# Patient Record
Sex: MALE | Race: WHITE | Employment: UNEMPLOYED | URBAN - METROPOLITAN AREA
[De-identification: names, ages, dates, MRNs, and addresses within clinical notes are randomized per-mention and may not be internally consistent; named-entity substitution may affect disease eponyms.]

---

## 2018-12-13 ENCOUNTER — HOSPITAL ENCOUNTER (EMERGENCY)
Age: 43
Discharge: HOME | End: 2018-12-13
Attending: EMERGENCY MEDICINE
Payer: MEDICARE

## 2018-12-13 ENCOUNTER — APPOINTMENT (OUTPATIENT)
Dept: GENERAL RADIOLOGY | Age: 43
End: 2018-12-13
Payer: MEDICARE

## 2018-12-13 VITALS
SYSTOLIC BLOOD PRESSURE: 137 MMHG | TEMPERATURE: 97.6 F | RESPIRATION RATE: 14 BRPM | HEART RATE: 96 BPM | WEIGHT: 185 LBS | OXYGEN SATURATION: 98 % | DIASTOLIC BLOOD PRESSURE: 89 MMHG | BODY MASS INDEX: 25.09 KG/M2

## 2018-12-13 DIAGNOSIS — S61.012A LACERATION OF LEFT THUMB WITHOUT FOREIGN BODY WITHOUT DAMAGE TO NAIL, INITIAL ENCOUNTER: Primary | ICD-10-CM

## 2018-12-13 PROCEDURE — 96372 THER/PROPH/DIAG INJ SC/IM: CPT

## 2018-12-13 PROCEDURE — 99283 EMERGENCY DEPT VISIT LOW MDM: CPT

## 2018-12-13 PROCEDURE — 2500000003 HC RX 250 WO HCPCS: Performed by: STUDENT IN AN ORGANIZED HEALTH CARE EDUCATION/TRAINING PROGRAM

## 2018-12-13 PROCEDURE — 12001 RPR S/N/AX/GEN/TRNK 2.5CM/<: CPT

## 2018-12-13 PROCEDURE — 73130 X-RAY EXAM OF HAND: CPT

## 2018-12-13 PROCEDURE — 6360000002 HC RX W HCPCS: Performed by: STUDENT IN AN ORGANIZED HEALTH CARE EDUCATION/TRAINING PROGRAM

## 2018-12-13 RX ORDER — KETOROLAC TROMETHAMINE 30 MG/ML
30 INJECTION, SOLUTION INTRAMUSCULAR; INTRAVENOUS ONCE
Status: COMPLETED | OUTPATIENT
Start: 2018-12-13 | End: 2018-12-13

## 2018-12-13 RX ORDER — IBUPROFEN 600 MG/1
600 TABLET ORAL EVERY 6 HOURS PRN
Qty: 30 TABLET | Refills: 0 | Status: SHIPPED | OUTPATIENT
Start: 2018-12-13

## 2018-12-13 RX ORDER — CEPHALEXIN 500 MG/1
500 CAPSULE ORAL 4 TIMES DAILY
Qty: 21 CAPSULE | Refills: 0 | Status: SHIPPED | OUTPATIENT
Start: 2018-12-13

## 2018-12-13 RX ORDER — LIDOCAINE HYDROCHLORIDE 10 MG/ML
20 INJECTION, SOLUTION INFILTRATION; PERINEURAL ONCE
Status: COMPLETED | OUTPATIENT
Start: 2018-12-13 | End: 2018-12-13

## 2018-12-13 RX ADMIN — LIDOCAINE HYDROCHLORIDE 20 ML: 10 INJECTION, SOLUTION INFILTRATION; PERINEURAL at 14:12

## 2018-12-13 RX ADMIN — KETOROLAC TROMETHAMINE 30 MG: 30 INJECTION, SOLUTION INTRAMUSCULAR at 12:19

## 2018-12-13 ASSESSMENT — ENCOUNTER SYMPTOMS
BACK PAIN: 0
NAUSEA: 0
RHINORRHEA: 0
ABDOMINAL PAIN: 0
COUGH: 0
VOMITING: 0
SHORTNESS OF BREATH: 0

## 2018-12-13 ASSESSMENT — PAIN SCALES - GENERAL: PAINLEVEL_OUTOF10: 0

## 2018-12-13 ASSESSMENT — PAIN DESCRIPTION - DESCRIPTORS: DESCRIPTORS: NUMBNESS

## 2018-12-13 NOTE — ED PROVIDER NOTES
Negative for cough and shortness of breath. Cardiovascular: Negative for chest pain. Gastrointestinal: Negative for abdominal pain, nausea and vomiting. Genitourinary: Negative for decreased urine volume and urgency. Musculoskeletal: Negative for back pain and gait problem. Skin: Negative for rash. Neurological: Positive for numbness. Negative for light-headedness and headaches. PHYSICAL EXAM   (up to 7 for level 4, 8 or more for level 5)      INITIAL VITALS:   /89   Pulse 96   Temp 97.6 °F (36.4 °C) (Oral)   Resp 14   Wt 185 lb (83.9 kg)   SpO2 98%   BMI 25.09 kg/m²     Physical Exam   Constitutional: He is oriented to person, place, and time. He appears well-developed and well-nourished. No distress. HENT:   Head: Normocephalic and atraumatic. Mouth/Throat: Oropharynx is clear and moist.   Eyes: Conjunctivae and EOM are normal.   Cardiovascular: Normal rate and regular rhythm. Pulmonary/Chest: Effort normal and breath sounds normal.   Abdominal: Soft. Bowel sounds are normal.   Musculoskeletal: Normal range of motion. He exhibits no edema. Left hand: He exhibits tenderness, laceration and swelling. He exhibits no bony tenderness, normal capillary refill and no deformity. Decreased strength noted. He exhibits finger abduction and thumb/finger opposition. He exhibits no wrist extension trouble. Hands:  Patient reports sensation is dulled to left thenar eminence; muscle belly exposed over thenar eminence   Neurological: He is alert and oriented to person, place, and time. Skin: Skin is warm. See MSK exam for hand lac   Nursing note and vitals reviewed.       DIFFERENTIAL  DIAGNOSIS     PLAN (LABS / IMAGING / EKG):  Orders Placed This Encounter   Procedures    XR HAND LEFT (MIN 3 VIEWS)       MEDICATIONS ORDERED:  Orders Placed This Encounter   Medications    ketorolac (TORADOL) injection 30 mg    lidocaine 1 % injection 20 mL    cephALEXin (KEFLEX) 500 MG Jc Vazquez MD  EmergencyMedicine Resident    (Please note that portions of this note were completed with a voice recognition program.  Efforts were made to edit the dictations but occasionally words are mis-transcribed.)       Jose Méndez MD  12/13/18 [id number]

## 2018-12-13 NOTE — ED PROVIDER NOTES
elements of the E/M (history, physical exam, and MDM). Additional findings are as noted. For APC cases I have personally evaluated and examined the patient in conjunction with the APC and agree with the treatment plan and disposition of the patient as recorded by the APC.     Peng Mora MD  Attending Emergency  Physician       Jonathon Bland MD  12/13/18 [id number]

## 2022-08-24 ENCOUNTER — HOSPITAL ENCOUNTER (EMERGENCY)
Age: 47
Discharge: HOME | End: 2022-08-24
Attending: EMERGENCY MEDICINE
Payer: MEDICARE

## 2022-08-24 VITALS
HEIGHT: 72 IN | BODY MASS INDEX: 31.15 KG/M2 | TEMPERATURE: 97.8 F | OXYGEN SATURATION: 98 % | WEIGHT: 230 LBS | DIASTOLIC BLOOD PRESSURE: 87 MMHG | RESPIRATION RATE: 18 BRPM | HEART RATE: 100 BPM | SYSTOLIC BLOOD PRESSURE: 128 MMHG

## 2022-08-24 DIAGNOSIS — L30.9 DERMATITIS: Primary | ICD-10-CM

## 2022-08-24 PROCEDURE — 6370000000 HC RX 637 (ALT 250 FOR IP): Performed by: EMERGENCY MEDICINE

## 2022-08-24 PROCEDURE — 99283 EMERGENCY DEPT VISIT LOW MDM: CPT

## 2022-08-24 RX ORDER — PREDNISONE 10 MG/1
TABLET ORAL
Qty: 40 TABLET | Refills: 0 | Status: SHIPPED | OUTPATIENT
Start: 2022-08-24

## 2022-08-24 RX ORDER — PREDNISONE 20 MG/1
40 TABLET ORAL ONCE
Status: COMPLETED | OUTPATIENT
Start: 2022-08-24 | End: 2022-08-24

## 2022-08-24 RX ADMIN — PREDNISONE 40 MG: 20 TABLET ORAL at 19:40

## 2022-08-24 ASSESSMENT — ENCOUNTER SYMPTOMS
VOMITING: 0
COUGH: 0
DIARRHEA: 0
ABDOMINAL PAIN: 0
NAUSEA: 0
SHORTNESS OF BREATH: 0
CONSTIPATION: 0

## 2022-08-24 ASSESSMENT — PAIN - FUNCTIONAL ASSESSMENT: PAIN_FUNCTIONAL_ASSESSMENT: 0-10

## 2022-08-24 NOTE — ED PROVIDER NOTES
[address] ED  eMERGENCY dEPARTMENT eNCOUnter      Pt Name: Raymond Jeong  MRN: [id number]  [place] 1975  Date of evaluation: 8/24/22      CHIEF COMPLAINT       Chief Complaint   Patient presents with    Rash     Pt presents with rash to BLE and BLE, suspects poison ivy/sumac. Has been using a cream x3 days and has not improved. No resp complaints. HISTORY OF PRESENT ILLNESS    Raymond Jeong is a 55 y.o. male who presents complaining of rash    Location/Symptom: Diffuse rash and itching  Timing/Onset: 2 days  Context/Setting: Patient was pulling some weeds around the barn and did not pay attention to poison oak in the area  Quality: Itching and burning  Duration: Constant  Modifying Factors: Tried a cream that the pharmacy recommended with no help  Severity: Severe      REVIEW OF SYSTEMS       Review of Systems   Constitutional:  Negative for chills and fever. Respiratory:  Negative for cough and shortness of breath. Cardiovascular:  Negative for chest pain and palpitations. Gastrointestinal:  Negative for abdominal pain, constipation, diarrhea, nausea and vomiting. Skin:  Positive for rash. Neurological:  Negative for dizziness, seizures and headaches. 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 BENZTROPINE (COGENTIN) 2 MG TABLET    Take 1 tablet by mouth 2 times daily.     CEPHALEXIN (KEFLEX) 500 MG CAPSULE    Take 1 capsule by mouth 4 times daily    CLONIDINE (CATAPRES) 0.1 MG TABLET    Take 0.1 mg by mouth nightly. FLUTICASONE-SALMETEROL (ADVAIR HFA IN)    Inhale  into the lungs. IBUPROFEN (ADVIL;MOTRIN) 600 MG TABLET    Take 1 tablet by mouth every 6 hours as needed for Pain    MIRTAZAPINE (REMERON) 45 MG TABLET    Take 1 tablet by mouth nightly. PHENYTOIN (DILANTIN) 100 MG ER CAPSULE    Take 1 capsule by mouth 3 times daily. QUETIAPINE (SEROQUEL) 100 MG TABLET    Take 1 tablet by mouth 2 times daily at 0800 and 1400. QUETIAPINE (SEROQUEL) 400 MG TABLET    Take 2 tablets by mouth nightly. TRAZODONE (DESYREL) 300 MG TABLET    Take 1 tablet by mouth nightly as needed for Sleep. VILAZODONE HCL (VIIBRYD) 40 MG TABS    Take 1 tablet by mouth daily. ALLERGIES     is allergic to latex, fish-derived products, and morphine. SOCIAL HISTORY      reports that he has been smoking cigarettes. He has a 30.00 pack-year smoking history. He does not have any smokeless tobacco history on file. He reports current alcohol use. He reports current drug use. Drugs: Cocaine and Marijuana ([place]). PHYSICAL EXAM     INITIAL VITALS: /87   Pulse 100   Temp 97.8 °F (36.6 °C) (Oral)   Resp 18   Ht 6' (1.829 m)   Wt 230 lb (104.3 kg)   SpO2 98%   BMI 31.19 kg/m²      Physical Exam  Vitals and nursing note reviewed. Constitutional:       General: He is not in acute distress. Appearance: He is well-developed. He is not diaphoretic. HENT:      Head: Normocephalic and atraumatic. Eyes:      General: No scleral icterus. Right eye: No discharge. Left eye: No discharge. Conjunctiva/sclera: Conjunctivae normal.      Pupils: Pupils are equal, round, and reactive to light. Pulmonary:      Effort: Pulmonary effort is normal. No respiratory distress. Skin:     General: Skin is warm and dry. Coloration: Skin is not pale. Findings: Rash present. No erythema. Rash is vesicular. Neurological:      Mental Status: He is alert and oriented to person, place, and time. Cranial Nerves: No cranial nerve deficit. Sensory: No sensory deficit. Motor: No weakness. Coordination: Coordination normal.   Psychiatric:         Behavior: Behavior normal.         Thought Content: Thought content normal.         Judgment: Judgment normal.       DIAGNOSTIC RESULTS     RADIOLOGY:All plain film, CT,MRI, and formal ultrasound images (except ED bedside ultrasound) are read by the radiologist and the interpretations are directly viewed by the emergency physician. LABS: All lab results were reviewed by myself, and all abnormals are listed below. Labs Reviewed - No data to display      MEDICAL DECISION MAKING:     Patient looks like this is a contact dermatitis probably from poison oak. We will start him on a steroid taper. EMERGENCY DEPARTMENT COURSE:   Vitals:    Vitals:    08/24/22 1857 08/24/22 1858   BP: 128/87    Pulse: 100    Resp: 18    Temp: 97.8 °F (36.6 °C)    TempSrc: Oral    SpO2: 98%    Weight:  230 lb (104.3 kg)   Height:  6' (1.829 m)       The patient was given the following medications while in the emergency department:  Orders Placed This Encounter   Medications    predniSONE (DELTASONE) tablet 40 mg    predniSONE (DELTASONE) 10 MG tablet     Sig: Take 4 tablets once a day for 4 days, take 3 tablets once a day for 4 days, take 2 tablets once a day for 4 days, take one tablet once a day for 4 days     Dispense:  40 tablet     Refill:  0       -------------------------      CONSULTS:  None    PROCEDURES:  None    FINAL IMPRESSION      1.  Dermatitis          DISPOSITION/PLAN   DISPOSITION Decision To Discharge 08/24/2022 07:36:15 PM      PATIENT REFERREDTO:  Caryle Breath, MD  [address]  [facility] [address] [place] [id number]    In 1 week      [facility] ED  [facility] [address] [place] [address]  [phone]    If symptoms worsen      DISCHARGEMEDICATIONS:  New